# Patient Record
Sex: MALE | Race: ASIAN | NOT HISPANIC OR LATINO | URBAN - METROPOLITAN AREA
[De-identification: names, ages, dates, MRNs, and addresses within clinical notes are randomized per-mention and may not be internally consistent; named-entity substitution may affect disease eponyms.]

---

## 2022-09-25 ENCOUNTER — INPATIENT (INPATIENT)
Facility: HOSPITAL | Age: 52
LOS: 3 days | Discharge: HOME | End: 2022-09-29
Attending: STUDENT IN AN ORGANIZED HEALTH CARE EDUCATION/TRAINING PROGRAM | Admitting: STUDENT IN AN ORGANIZED HEALTH CARE EDUCATION/TRAINING PROGRAM

## 2022-09-25 VITALS
DIASTOLIC BLOOD PRESSURE: 69 MMHG | SYSTOLIC BLOOD PRESSURE: 113 MMHG | TEMPERATURE: 98 F | OXYGEN SATURATION: 98 % | RESPIRATION RATE: 18 BRPM | HEART RATE: 83 BPM

## 2022-09-25 DIAGNOSIS — H81.23 VESTIBULAR NEURONITIS, BILATERAL: ICD-10-CM

## 2022-09-25 LAB
ALBUMIN SERPL ELPH-MCNC: 4.7 G/DL — SIGNIFICANT CHANGE UP (ref 3.5–5.2)
ALP SERPL-CCNC: 73 U/L — SIGNIFICANT CHANGE UP (ref 30–115)
ALT FLD-CCNC: 21 U/L — SIGNIFICANT CHANGE UP (ref 0–41)
ANION GAP SERPL CALC-SCNC: 15 MMOL/L — HIGH (ref 7–14)
AST SERPL-CCNC: 25 U/L — SIGNIFICANT CHANGE UP (ref 0–41)
BASOPHILS # BLD AUTO: 0.02 K/UL — SIGNIFICANT CHANGE UP (ref 0–0.2)
BASOPHILS NFR BLD AUTO: 0.3 % — SIGNIFICANT CHANGE UP (ref 0–1)
BILIRUB SERPL-MCNC: 0.7 MG/DL — SIGNIFICANT CHANGE UP (ref 0.2–1.2)
BUN SERPL-MCNC: 13 MG/DL — SIGNIFICANT CHANGE UP (ref 10–20)
CALCIUM SERPL-MCNC: 9.3 MG/DL — SIGNIFICANT CHANGE UP (ref 8.4–10.5)
CHLORIDE SERPL-SCNC: 100 MMOL/L — SIGNIFICANT CHANGE UP (ref 98–110)
CO2 SERPL-SCNC: 22 MMOL/L — SIGNIFICANT CHANGE UP (ref 17–32)
CREAT SERPL-MCNC: 1 MG/DL — SIGNIFICANT CHANGE UP (ref 0.7–1.5)
EGFR: 92 ML/MIN/1.73M2 — SIGNIFICANT CHANGE UP
EOSINOPHIL # BLD AUTO: 0 K/UL — SIGNIFICANT CHANGE UP (ref 0–0.7)
EOSINOPHIL NFR BLD AUTO: 0 % — SIGNIFICANT CHANGE UP (ref 0–8)
GLUCOSE SERPL-MCNC: 150 MG/DL — HIGH (ref 70–99)
HCT VFR BLD CALC: 45.5 % — SIGNIFICANT CHANGE UP (ref 42–52)
HGB BLD-MCNC: 16.2 G/DL — SIGNIFICANT CHANGE UP (ref 14–18)
IMM GRANULOCYTES NFR BLD AUTO: 0.4 % — HIGH (ref 0.1–0.3)
LACTATE SERPL-SCNC: 4.1 MMOL/L — CRITICAL HIGH (ref 0.7–2)
LIDOCAIN IGE QN: 22 U/L — SIGNIFICANT CHANGE UP (ref 7–60)
LYMPHOCYTES # BLD AUTO: 1.24 K/UL — SIGNIFICANT CHANGE UP (ref 1.2–3.4)
LYMPHOCYTES # BLD AUTO: 18.3 % — LOW (ref 20.5–51.1)
MAGNESIUM SERPL-MCNC: 1.9 MG/DL — SIGNIFICANT CHANGE UP (ref 1.8–2.4)
MCHC RBC-ENTMCNC: 30.4 PG — SIGNIFICANT CHANGE UP (ref 27–31)
MCHC RBC-ENTMCNC: 35.6 G/DL — SIGNIFICANT CHANGE UP (ref 32–37)
MCV RBC AUTO: 85.4 FL — SIGNIFICANT CHANGE UP (ref 80–94)
MONOCYTES # BLD AUTO: 0.43 K/UL — SIGNIFICANT CHANGE UP (ref 0.1–0.6)
MONOCYTES NFR BLD AUTO: 6.3 % — SIGNIFICANT CHANGE UP (ref 1.7–9.3)
NEUTROPHILS # BLD AUTO: 5.07 K/UL — SIGNIFICANT CHANGE UP (ref 1.4–6.5)
NEUTROPHILS NFR BLD AUTO: 74.7 % — SIGNIFICANT CHANGE UP (ref 42.2–75.2)
NRBC # BLD: 0 /100 WBCS — SIGNIFICANT CHANGE UP (ref 0–0)
NT-PROBNP SERPL-SCNC: 59 PG/ML — SIGNIFICANT CHANGE UP (ref 0–300)
NT-PROBNP SERPL-SCNC: 62 PG/ML — SIGNIFICANT CHANGE UP (ref 0–300)
PLATELET # BLD AUTO: 231 K/UL — SIGNIFICANT CHANGE UP (ref 130–400)
POTASSIUM SERPL-MCNC: 4.1 MMOL/L — SIGNIFICANT CHANGE UP (ref 3.5–5)
POTASSIUM SERPL-SCNC: 4.1 MMOL/L — SIGNIFICANT CHANGE UP (ref 3.5–5)
PROT SERPL-MCNC: 7.4 G/DL — SIGNIFICANT CHANGE UP (ref 6–8)
RBC # BLD: 5.33 M/UL — SIGNIFICANT CHANGE UP (ref 4.7–6.1)
RBC # FLD: 13.2 % — SIGNIFICANT CHANGE UP (ref 11.5–14.5)
SARS-COV-2 RNA SPEC QL NAA+PROBE: SIGNIFICANT CHANGE UP
SODIUM SERPL-SCNC: 137 MMOL/L — SIGNIFICANT CHANGE UP (ref 135–146)
TROPONIN T SERPL-MCNC: <0.01 NG/ML — SIGNIFICANT CHANGE UP
WBC # BLD: 6.79 K/UL — SIGNIFICANT CHANGE UP (ref 4.8–10.8)
WBC # FLD AUTO: 6.79 K/UL — SIGNIFICANT CHANGE UP (ref 4.8–10.8)

## 2022-09-25 PROCEDURE — 71045 X-RAY EXAM CHEST 1 VIEW: CPT | Mod: 26

## 2022-09-25 PROCEDURE — 93010 ELECTROCARDIOGRAM REPORT: CPT

## 2022-09-25 PROCEDURE — 71275 CT ANGIOGRAPHY CHEST: CPT | Mod: 26,MA

## 2022-09-25 PROCEDURE — 70496 CT ANGIOGRAPHY HEAD: CPT | Mod: 26,MA

## 2022-09-25 PROCEDURE — 70498 CT ANGIOGRAPHY NECK: CPT | Mod: 26,MA

## 2022-09-25 PROCEDURE — 99291 CRITICAL CARE FIRST HOUR: CPT

## 2022-09-25 RX ORDER — SODIUM CHLORIDE 9 MG/ML
1000 INJECTION, SOLUTION INTRAVENOUS ONCE
Refills: 0 | Status: COMPLETED | OUTPATIENT
Start: 2022-09-25 | End: 2022-09-25

## 2022-09-25 RX ORDER — MECLIZINE HCL 12.5 MG
50 TABLET ORAL ONCE
Refills: 0 | Status: COMPLETED | OUTPATIENT
Start: 2022-09-25 | End: 2022-09-25

## 2022-09-25 RX ORDER — METOCLOPRAMIDE HCL 10 MG
10 TABLET ORAL ONCE
Refills: 0 | Status: COMPLETED | OUTPATIENT
Start: 2022-09-25 | End: 2022-09-25

## 2022-09-25 RX ADMIN — Medication 104 MILLIGRAM(S): at 21:40

## 2022-09-25 RX ADMIN — Medication 50 MILLIGRAM(S): at 21:40

## 2022-09-25 RX ADMIN — SODIUM CHLORIDE 1000 MILLILITER(S): 9 INJECTION, SOLUTION INTRAVENOUS at 22:43

## 2022-09-25 RX ADMIN — SODIUM CHLORIDE 1000 MILLILITER(S): 9 INJECTION, SOLUTION INTRAVENOUS at 21:38

## 2022-09-25 NOTE — ED ADULT NURSE NOTE - OBJECTIVE STATEMENT
Pt with C/O N/V weakness on and off from Friday worse today with weakness SOB , chest pain with chills .

## 2022-09-25 NOTE — ED ADULT NURSE REASSESSMENT NOTE - NS ED NURSE REASSESS COMMENT FT1
Pt reassessed A/O times 4 Vs stable report filling less dizziness , C/O weakness  denies chest pain denies SOB, no N/V on  giving nursing observation.

## 2022-09-26 DIAGNOSIS — R09.89 OTHER SPECIFIED SYMPTOMS AND SIGNS INVOLVING THE CIRCULATORY AND RESPIRATORY SYSTEMS: ICD-10-CM

## 2022-09-26 LAB
A1C WITH ESTIMATED AVERAGE GLUCOSE RESULT: 6.2 % — HIGH (ref 4–5.6)
ALBUMIN SERPL ELPH-MCNC: 4.2 G/DL — SIGNIFICANT CHANGE UP (ref 3.5–5.2)
ALP SERPL-CCNC: 63 U/L — SIGNIFICANT CHANGE UP (ref 30–115)
ALT FLD-CCNC: 17 U/L — SIGNIFICANT CHANGE UP (ref 0–41)
ANION GAP SERPL CALC-SCNC: 11 MMOL/L — SIGNIFICANT CHANGE UP (ref 7–14)
APPEARANCE UR: CLEAR — SIGNIFICANT CHANGE UP
APTT BLD: 27.1 SEC — SIGNIFICANT CHANGE UP (ref 27–39.2)
APTT BLD: 95.2 SEC — CRITICAL HIGH (ref 27–39.2)
AST SERPL-CCNC: 19 U/L — SIGNIFICANT CHANGE UP (ref 0–41)
BASOPHILS # BLD AUTO: 0.02 K/UL — SIGNIFICANT CHANGE UP (ref 0–0.2)
BASOPHILS NFR BLD AUTO: 0.3 % — SIGNIFICANT CHANGE UP (ref 0–1)
BILIRUB SERPL-MCNC: 0.7 MG/DL — SIGNIFICANT CHANGE UP (ref 0.2–1.2)
BILIRUB UR-MCNC: NEGATIVE — SIGNIFICANT CHANGE UP
BLD GP AB SCN SERPL QL: SIGNIFICANT CHANGE UP
BUN SERPL-MCNC: 10 MG/DL — SIGNIFICANT CHANGE UP (ref 10–20)
CALCIUM SERPL-MCNC: 8.7 MG/DL — SIGNIFICANT CHANGE UP (ref 8.4–10.5)
CHLORIDE SERPL-SCNC: 105 MMOL/L — SIGNIFICANT CHANGE UP (ref 98–110)
CHOLEST SERPL-MCNC: 266 MG/DL — HIGH
CO2 SERPL-SCNC: 26 MMOL/L — SIGNIFICANT CHANGE UP (ref 17–32)
COLOR SPEC: SIGNIFICANT CHANGE UP
CREAT SERPL-MCNC: 1 MG/DL — SIGNIFICANT CHANGE UP (ref 0.7–1.5)
D DIMER BLD IA.RAPID-MCNC: 287 NG/ML DDU — HIGH (ref 0–230)
DIFF PNL FLD: NEGATIVE — SIGNIFICANT CHANGE UP
EGFR: 92 ML/MIN/1.73M2 — SIGNIFICANT CHANGE UP
EOSINOPHIL # BLD AUTO: 0.02 K/UL — SIGNIFICANT CHANGE UP (ref 0–0.7)
EOSINOPHIL NFR BLD AUTO: 0.3 % — SIGNIFICANT CHANGE UP (ref 0–8)
ESTIMATED AVERAGE GLUCOSE: 131 MG/DL — HIGH (ref 68–114)
GLUCOSE BLDC GLUCOMTR-MCNC: 111 MG/DL — HIGH (ref 70–99)
GLUCOSE BLDC GLUCOMTR-MCNC: 115 MG/DL — HIGH (ref 70–99)
GLUCOSE SERPL-MCNC: 106 MG/DL — HIGH (ref 70–99)
GLUCOSE UR QL: NEGATIVE — SIGNIFICANT CHANGE UP
HCT VFR BLD CALC: 42.9 % — SIGNIFICANT CHANGE UP (ref 42–52)
HDLC SERPL-MCNC: 47 MG/DL — SIGNIFICANT CHANGE UP
HGB BLD-MCNC: 14.8 G/DL — SIGNIFICANT CHANGE UP (ref 14–18)
IMM GRANULOCYTES NFR BLD AUTO: 0.1 % — SIGNIFICANT CHANGE UP (ref 0.1–0.3)
INR BLD: 1.05 RATIO — SIGNIFICANT CHANGE UP (ref 0.65–1.3)
KETONES UR-MCNC: NEGATIVE — SIGNIFICANT CHANGE UP
LACTATE SERPL-SCNC: 1.4 MMOL/L — SIGNIFICANT CHANGE UP (ref 0.7–2)
LACTATE SERPL-SCNC: 1.7 MMOL/L — SIGNIFICANT CHANGE UP (ref 0.7–2)
LEUKOCYTE ESTERASE UR-ACNC: NEGATIVE — SIGNIFICANT CHANGE UP
LIPID PNL WITH DIRECT LDL SERPL: 196 MG/DL — HIGH
LYMPHOCYTES # BLD AUTO: 2.31 K/UL — SIGNIFICANT CHANGE UP (ref 1.2–3.4)
LYMPHOCYTES # BLD AUTO: 34.5 % — SIGNIFICANT CHANGE UP (ref 20.5–51.1)
MAGNESIUM SERPL-MCNC: 2.1 MG/DL — SIGNIFICANT CHANGE UP (ref 1.8–2.4)
MCHC RBC-ENTMCNC: 30.3 PG — SIGNIFICANT CHANGE UP (ref 27–31)
MCHC RBC-ENTMCNC: 34.5 G/DL — SIGNIFICANT CHANGE UP (ref 32–37)
MCV RBC AUTO: 87.7 FL — SIGNIFICANT CHANGE UP (ref 80–94)
MONOCYTES # BLD AUTO: 0.66 K/UL — HIGH (ref 0.1–0.6)
MONOCYTES NFR BLD AUTO: 9.9 % — HIGH (ref 1.7–9.3)
NEUTROPHILS # BLD AUTO: 3.67 K/UL — SIGNIFICANT CHANGE UP (ref 1.4–6.5)
NEUTROPHILS NFR BLD AUTO: 54.9 % — SIGNIFICANT CHANGE UP (ref 42.2–75.2)
NITRITE UR-MCNC: NEGATIVE — SIGNIFICANT CHANGE UP
NON HDL CHOLESTEROL: 219 MG/DL — HIGH
NRBC # BLD: 0 /100 WBCS — SIGNIFICANT CHANGE UP (ref 0–0)
PH UR: 7 — SIGNIFICANT CHANGE UP (ref 5–8)
PLATELET # BLD AUTO: 231 K/UL — SIGNIFICANT CHANGE UP (ref 130–400)
POTASSIUM SERPL-MCNC: 3.5 MMOL/L — SIGNIFICANT CHANGE UP (ref 3.5–5)
POTASSIUM SERPL-SCNC: 3.5 MMOL/L — SIGNIFICANT CHANGE UP (ref 3.5–5)
PROT SERPL-MCNC: 6.5 G/DL — SIGNIFICANT CHANGE UP (ref 6–8)
PROT UR-MCNC: SIGNIFICANT CHANGE UP
PROTHROM AB SERPL-ACNC: 12.1 SEC — SIGNIFICANT CHANGE UP (ref 9.95–12.87)
RBC # BLD: 4.89 M/UL — SIGNIFICANT CHANGE UP (ref 4.7–6.1)
RBC # FLD: 13.2 % — SIGNIFICANT CHANGE UP (ref 11.5–14.5)
SODIUM SERPL-SCNC: 142 MMOL/L — SIGNIFICANT CHANGE UP (ref 135–146)
SP GR SPEC: >1.05 (ref 1.01–1.03)
TRIGL SERPL-MCNC: 116 MG/DL — SIGNIFICANT CHANGE UP
TROPONIN T SERPL-MCNC: <0.01 NG/ML — SIGNIFICANT CHANGE UP
TSH SERPL-MCNC: 1.81 UIU/ML — SIGNIFICANT CHANGE UP (ref 0.27–4.2)
UROBILINOGEN FLD QL: SIGNIFICANT CHANGE UP
WBC # BLD: 6.69 K/UL — SIGNIFICANT CHANGE UP (ref 4.8–10.8)
WBC # FLD AUTO: 6.69 K/UL — SIGNIFICANT CHANGE UP (ref 4.8–10.8)

## 2022-09-26 PROCEDURE — 99222 1ST HOSP IP/OBS MODERATE 55: CPT

## 2022-09-26 PROCEDURE — 99223 1ST HOSP IP/OBS HIGH 75: CPT

## 2022-09-26 RX ORDER — ACETAMINOPHEN 500 MG
650 TABLET ORAL ONCE
Refills: 0 | Status: COMPLETED | OUTPATIENT
Start: 2022-09-26 | End: 2022-09-26

## 2022-09-26 RX ORDER — CHLORHEXIDINE GLUCONATE 213 G/1000ML
1 SOLUTION TOPICAL
Refills: 0 | Status: DISCONTINUED | OUTPATIENT
Start: 2022-09-26 | End: 2022-09-29

## 2022-09-26 RX ORDER — MECLIZINE HCL 12.5 MG
50 TABLET ORAL ONCE
Refills: 0 | Status: COMPLETED | OUTPATIENT
Start: 2022-09-26 | End: 2022-09-26

## 2022-09-26 RX ORDER — ACETAMINOPHEN 500 MG
650 TABLET ORAL EVERY 6 HOURS
Refills: 0 | Status: DISCONTINUED | OUTPATIENT
Start: 2022-09-26 | End: 2022-09-29

## 2022-09-26 RX ORDER — HEPARIN SODIUM 5000 [USP'U]/ML
INJECTION INTRAVENOUS; SUBCUTANEOUS
Qty: 25000 | Refills: 0 | Status: DISCONTINUED | OUTPATIENT
Start: 2022-09-26 | End: 2022-09-26

## 2022-09-26 RX ORDER — HEPARIN SODIUM 5000 [USP'U]/ML
3000 INJECTION INTRAVENOUS; SUBCUTANEOUS EVERY 6 HOURS
Refills: 0 | Status: DISCONTINUED | OUTPATIENT
Start: 2022-09-26 | End: 2022-09-26

## 2022-09-26 RX ORDER — PANTOPRAZOLE SODIUM 20 MG/1
40 TABLET, DELAYED RELEASE ORAL
Refills: 0 | Status: DISCONTINUED | OUTPATIENT
Start: 2022-09-26 | End: 2022-09-29

## 2022-09-26 RX ORDER — HEPARIN SODIUM 5000 [USP'U]/ML
6500 INJECTION INTRAVENOUS; SUBCUTANEOUS EVERY 6 HOURS
Refills: 0 | Status: DISCONTINUED | OUTPATIENT
Start: 2022-09-26 | End: 2022-09-26

## 2022-09-26 RX ORDER — ENOXAPARIN SODIUM 100 MG/ML
80 INJECTION SUBCUTANEOUS EVERY 12 HOURS
Refills: 0 | Status: DISCONTINUED | OUTPATIENT
Start: 2022-09-26 | End: 2022-09-28

## 2022-09-26 RX ORDER — HEPARIN SODIUM 5000 [USP'U]/ML
6500 INJECTION INTRAVENOUS; SUBCUTANEOUS ONCE
Refills: 0 | Status: DISCONTINUED | OUTPATIENT
Start: 2022-09-26 | End: 2022-09-26

## 2022-09-26 RX ORDER — ATORVASTATIN CALCIUM 80 MG/1
80 TABLET, FILM COATED ORAL AT BEDTIME
Refills: 0 | Status: DISCONTINUED | OUTPATIENT
Start: 2022-09-26 | End: 2022-09-29

## 2022-09-26 RX ADMIN — SODIUM CHLORIDE 1000 MILLILITER(S): 9 INJECTION, SOLUTION INTRAVENOUS at 05:27

## 2022-09-26 RX ADMIN — Medication 650 MILLIGRAM(S): at 23:03

## 2022-09-26 RX ADMIN — HEPARIN SODIUM 1400 UNIT(S)/HR: 5000 INJECTION INTRAVENOUS; SUBCUTANEOUS at 04:56

## 2022-09-26 RX ADMIN — SODIUM CHLORIDE 1000 MILLILITER(S): 9 INJECTION, SOLUTION INTRAVENOUS at 05:28

## 2022-09-26 RX ADMIN — Medication 650 MILLIGRAM(S): at 23:43

## 2022-09-26 RX ADMIN — ENOXAPARIN SODIUM 80 MILLIGRAM(S): 100 INJECTION SUBCUTANEOUS at 18:44

## 2022-09-26 RX ADMIN — Medication 50 MILLIGRAM(S): at 05:27

## 2022-09-26 RX ADMIN — HEPARIN SODIUM 6500 UNIT(S): 5000 INJECTION INTRAVENOUS; SUBCUTANEOUS at 04:55

## 2022-09-26 RX ADMIN — ATORVASTATIN CALCIUM 80 MILLIGRAM(S): 80 TABLET, FILM COATED ORAL at 21:30

## 2022-09-26 RX ADMIN — Medication 10 MILLIGRAM(S): at 05:28

## 2022-09-26 RX ADMIN — PANTOPRAZOLE SODIUM 40 MILLIGRAM(S): 20 TABLET, DELAYED RELEASE ORAL at 10:50

## 2022-09-26 RX ADMIN — Medication 650 MILLIGRAM(S): at 14:00

## 2022-09-26 NOTE — H&P ADULT - NSHPREVIEWOFSYSTEMS_GEN_ALL_CORE
CONSTITUTIONAL - No fever, No diaphoresis, No weight change  SKIN - No rash  HEMATOLOGIC - No abnormal bleeding or bruising  EYES - No eye pain or changes in vison, endorses sevre dizziness   ENT - No change in hearing, No sore throat, No neck pain, No rhinorrhea, No ear pain  RESPIRATORY - No shortness of breath, No cough  CARDIAC -No chest pain, No palpitations  GI - No abdominal pain, endorses nausea, No vomiting, No diarrhea, No constipation, No bright red blood per rectum or melena. No flank pain  - No dysuria, frequency, hematuria.   ENDO - No polydypsia, No polyuria, No heat/cold intolerance  MUSCULOSKELETAL - No joint paint, No swelling, No back pain  NEUROLOGIC - No numbness, No focal weakness, No headache, No dizziness  All other systems negative, unless specified in HPI
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
## 2022-09-26 NOTE — H&P ADULT - ATTENDING COMMENTS
EKG reviewed normal sinus rhythm, non-specific ST-T wave abnormality  CXR reviewed no bilateral opacity or effusion    PHYSICAL EXAM:  General Appearance: NAD, normal for age and gender. 	  Neck: Normal JVP, no bruit.   Eyes: Conjunctiva clear, Extra Ocular muscles intact. No scleral icterus. Nystagmus noted.  ENMT: Moist oral mucosa. No oral lesion.  Cardiovascular: Regular rate and rhythm S1 S2, No JVD, No murmurs.  Respiratory: Lungs clear to auscultation. No wheezes, rales or rhonchi.  Psychiatry: Alert and oriented x 3, Mood & affect appropriate.  Gastrointestinal:  Soft, Non-tender, Non-distended.  Neurologic: Mild facial droop no other focal deficits.  Musculoskeletal/ extremities: Normal range of motion. No joint swelling. No clubbing, cyanosis or edema.  Vascular: Peripheral pulses palpable bilaterally.  Skin/Integumen: No rashes, No ecchymoses, No cyanosis.    # Dizziness, room spinning rule out CVA   - check lipid profile, [id number] and TSH  - MR Head  - follow up neurology  - obtain Echo with bubble study     # Reading of acute pulmonary of ANGIE without right heart strain  - start anticoagulation for now, repeat CTA chest recommended by radiologist for further confirmation  - obtain D-dimer and LE venous duplex    # Elevated lactic acidosis likely 2/2 dehydration  - repeat lactate after hydration    # DVT prophylaxis - on lovenox subcut  # Code status - Full Code EKG reviewed normal sinus rhythm, non-specific ST-T wave abnormality  CXR reviewed no bilateral opacity or effusion    PHYSICAL EXAM:  General Appearance: NAD, normal for age and gender. 	  Neck: Normal JVP, no bruit.   Eyes: Conjunctiva clear, Extra Ocular muscles intact. No scleral icterus. Nystagmus noted.  ENMT: Moist oral mucosa. No oral lesion.  Cardiovascular: Regular rate and rhythm S1 S2, No JVD, No murmurs.  Respiratory: Lungs clear to auscultation. No wheezes, rales or rhonchi.  Psychiatry: Alert and oriented x 3, Mood & affect appropriate.  Gastrointestinal:  Soft, Non-tender, Non-distended.  Neurologic: Mild facial droop no other focal deficits.  Musculoskeletal/ extremities: Normal range of motion. No joint swelling. No clubbing, cyanosis or edema.  Vascular: Peripheral pulses palpable bilaterally.  Skin/Integumen: No rashes, No ecchymoses, No cyanosis.    # Dizziness, room spinning, right facial droop rule out CVA   - check lipid profile, [id number] and TSH  - MR Head  - follow up neurology  - obtain Echo with bubble study     # Reading of acute pulmonary of ANGIE without right heart strain  - start anticoagulation for now, repeat CTA chest recommended by radiologist for further confirmation  - obtain D-dimer and LE venous duplex    # Elevated lactic acidosis likely 2/2 dehydration  - repeat lactate after hydration    # DVT prophylaxis - on lovenox subcut  # Code status - Full Code

## 2022-09-26 NOTE — H&P ADULT - NSICDXFAMILYHX_GEN_ALL_CORE_FT
FAMILY HISTORY:  Father  Still living? Unknown  FH: thyroid cancer, Age at diagnosis: Age Unknown    Mother  Still living? Unknown  Family history of stroke, Age at diagnosis: Age Unknown
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
## 2022-09-26 NOTE — ED PROVIDER NOTE - NS ED ROS FT
Review of Systems:  	•	CONSTITUTIONAL - no fever, no diaphoresis, no chills  	•	SKIN - no rash  	•	HEMATOLOGIC - no bleeding, no bruising  	•	EYES - no eye pain, no blurry vision  	•	ENT - no congestion  	•	RESPIRATORY - no shortness of breath, no cough  	•	CARDIAC - +chest pain, no palpitations  	•	GI - no abd pain, + nausea, + vomiting, no diarrhea, no constipation  	•	GENITO-URINARY - no dysuria; no hematuria, no increased urinary frequency  	•	MUSCULOSKELETAL - no joint paint, no swelling, no redness  	•	NEUROLOGIC - +dizziness, no weakness, no headache, no paresthesias, no LOC  	•	PSYCH - no anxiety, no depression  	All other ROS are negative except as documented in HPI.

## 2022-09-26 NOTE — ED PROVIDER NOTE - OBJECTIVE STATEMENT
51-year-old male with no past medical history presenting for evaluation of dizziness associated with vomiting since Friday night.  States dizziness is worse with movement.  Patient also reports having midsternal, nonradiating chest pain today which got better.  Symptoms are moderate.  No alleviating factors.  Took Tylenol with no relief.  No recent travel, recent surgery, leg swelling, calf pain, or history of blood clots or hormonal use.
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
## 2022-09-26 NOTE — DISCHARGE NOTE NURSING/CASE MANAGEMENT/SOCIAL WORK - PATIENT PORTAL LINK FT
You can access the FollowMyHealth Patient Portal offered by [facility] by registering at the following website: [email or web address]. By joining [facility]’s FollowMyHealth portal, you will also be able to view your health information using other applications (apps) compatible with our system.
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
## 2022-09-26 NOTE — SWALLOW BEDSIDE ASSESSMENT ADULT - SLP GENERAL OBSERVATIONS
pt received on ED stretcher eating breakfast w/o c/o pain. +persistent dizziness, MD aware. +room air +speech clear and fluent

## 2022-09-26 NOTE — ED PROVIDER NOTE - CARE PLAN
1 Principal Discharge DX:	Dizziness  Secondary Diagnosis:	Pulmonary embolism  Secondary Diagnosis:	Acute chest pain

## 2022-09-26 NOTE — CONSULT NOTE ADULT - NS ATTEND AMEND GEN_ALL_CORE FT
Pt is a 50 yo M with no significant PMHx who presents with vertigo x 3 days. Exam notable for left beating and vertical nystagmus, also present with primary gaze. Mild right facial paresis. NIHSS 1. CT head without acute process. CTA head/neck without evidence of significant flow limiting stenosis or dissection, however did note PE which was then not seen on CT chest (granted some motion degradation per radiology). No know h/o stroke, DVT/PE or clotting disorder. MRI brain pending, possible left pontine stroke. Recommend TTE with bubble study (may need ALESSANDRO). Hypercoag panel, lipid profile, A1c. Agree with AC. PT/OT/ST, tele, -160, q4 neurochecks.

## 2022-09-26 NOTE — ED ADULT NURSE REASSESSMENT NOTE - NS ED NURSE REASSESS COMMENT FT1
pt received from previous RN. pt is sleeping, even and unlabored breathing on RA, saturating 98%. pt receiving K iv, Cardiac monitoring in progress. pt awaiting ICU bed.

## 2022-09-26 NOTE — PATIENT PROFILE ADULT - FALL HARM RISK - HARM RISK INTERVENTIONS
Assistance with ambulation/Assistance OOB with selected safe patient handling equipment/Communicate Risk of Fall with Harm to all staff/Discuss with provider need for PT consult/Monitor gait and stability/Provide patient with walking aids - walker, cane, crutches/Reinforce activity limits and safety measures with patient and family/Sit up slowly, dangle for a short time, stand at bedside before walking/Tailored Fall Risk Interventions/Visual Cue: Yellow wristband and red socks/Bed in lowest position, wheels locked, appropriate side rails in place/Call bell, personal items and telephone in reach/Instruct patient to call for assistance before getting out of bed or chair/Non-slip footwear when patient is out of bed/[place] to call system/Physically safe environment - no spills, clutter or unnecessary equipment/Purposeful Proactive Rounding/Room/bathroom lighting operational, light cord in reach

## 2022-09-26 NOTE — ED ADULT NURSE REASSESSMENT NOTE - NS ED NURSE REASSESS COMMENT FT1
pt received from previous RN. pt is  currently sleeping, even and unlabored breathing on RA. Heparin infusing at 14ml/hr, no s/s of bleeding. cardiac monitoring in progress, wide at bedside. pt awating bed placement.

## 2022-09-26 NOTE — CONSULT NOTE ADULT - ASSESSMENT
51-year-old RHM with no past medical history, presenting for evaluation of room spinning dizziness since 3 days, worse today. Dizziness is described as spinning sensation especially when eyes are open and there is no alleviating factors. States dizziness is worse with any kind of movement or position change.  Patient also reports having midsternal, non radiating chest pain. Denies focal weakness numbness headache, speech deficits fall trauma or ear symptoms. CTH negative for acute intracranial finding. CTA H/N negative for LVO . CT chest revealed left ANGIE PE heparin drip started by medical team. nihss 1, has right facial asymmetry and fast phase rotatory nystagmus on exam and patient remains very symptomatic on exam.    #Dizziness  r/o posterior circulation cva      Plan  ·	Telemonitoring  ·	N/C MRI BRAIN   ·	Q 4 neuro checks and vital signs  ·	Echo with bubble  ·	Lipid profile, hbaic, tsh EKG, d-dimer  ·	Start Lipitor 80 mg q 24  ·	Dysphagia screen  ·	PT/REHAB  ·	Neuro attending note will follow

## 2022-09-26 NOTE — H&P ADULT - NSHPPHYSICALEXAM_GEN_ALL_CORE
GENERAL: well-developed, AAOx3, Mild right facial droop   HEENT:  Atraumatic, Normocephalic. EOMI, PERRLA, conjunctiva and sclera clear, No JVD, fast beating nystagmus left eye   PULMONARY: Clear to auscultation bilaterally; No wheeze  CARDIOVASCULAR: Regular rate and rhythm; No murmurs, rubs, or gallops  GASTROINTESTINAL: Soft, Nontender, Nondistended; Bowel sounds present  MUSCULOSKELETAL:  2+ Peripheral Pulses, No clubbing, cyanosis, or edema  NEUROLOGY: non-focal. intact strength bilateral upper and lower extremities, intact sensation  SKIN: No rashes or lesions

## 2022-09-26 NOTE — DISCHARGE NOTE NURSING/CASE MANAGEMENT/SOCIAL WORK - NSDCPEFALRISK_GEN_ALL_CORE
For information on Fall & Injury Prevention, visit: [email or web address] OR  [email or web address] OR  https://www.cdc.gov/steadi/patient.html
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
## 2022-09-27 LAB
ALBUMIN SERPL ELPH-MCNC: 4 G/DL — SIGNIFICANT CHANGE UP (ref 3.5–5.2)
ALP SERPL-CCNC: 59 U/L — SIGNIFICANT CHANGE UP (ref 30–115)
ALT FLD-CCNC: 19 U/L — SIGNIFICANT CHANGE UP (ref 0–41)
ANION GAP SERPL CALC-SCNC: 9 MMOL/L — SIGNIFICANT CHANGE UP (ref 7–14)
AST SERPL-CCNC: 21 U/L — SIGNIFICANT CHANGE UP (ref 0–41)
BASOPHILS # BLD AUTO: 0.02 K/UL — SIGNIFICANT CHANGE UP (ref 0–0.2)
BASOPHILS NFR BLD AUTO: 0.4 % — SIGNIFICANT CHANGE UP (ref 0–1)
BILIRUB SERPL-MCNC: 0.9 MG/DL — SIGNIFICANT CHANGE UP (ref 0.2–1.2)
BUN SERPL-MCNC: 10 MG/DL — SIGNIFICANT CHANGE UP (ref 10–20)
CALCIUM SERPL-MCNC: 8.4 MG/DL — SIGNIFICANT CHANGE UP (ref 8.4–10.5)
CHLORIDE SERPL-SCNC: 103 MMOL/L — SIGNIFICANT CHANGE UP (ref 98–110)
CO2 SERPL-SCNC: 28 MMOL/L — SIGNIFICANT CHANGE UP (ref 17–32)
CREAT SERPL-MCNC: 1 MG/DL — SIGNIFICANT CHANGE UP (ref 0.7–1.5)
EGFR: 92 ML/MIN/1.73M2 — SIGNIFICANT CHANGE UP
EOSINOPHIL # BLD AUTO: 0.06 K/UL — SIGNIFICANT CHANGE UP (ref 0–0.7)
EOSINOPHIL NFR BLD AUTO: 1.3 % — SIGNIFICANT CHANGE UP (ref 0–8)
GLUCOSE BLDC GLUCOMTR-MCNC: 112 MG/DL — HIGH (ref 70–99)
GLUCOSE BLDC GLUCOMTR-MCNC: 135 MG/DL — HIGH (ref 70–99)
GLUCOSE BLDC GLUCOMTR-MCNC: 99 MG/DL — SIGNIFICANT CHANGE UP (ref 70–99)
GLUCOSE SERPL-MCNC: 113 MG/DL — HIGH (ref 70–99)
HCT VFR BLD CALC: 41.2 % — LOW (ref 42–52)
HGB BLD-MCNC: 14.1 G/DL — SIGNIFICANT CHANGE UP (ref 14–18)
IMM GRANULOCYTES NFR BLD AUTO: 0.2 % — SIGNIFICANT CHANGE UP (ref 0.1–0.3)
LYMPHOCYTES # BLD AUTO: 2.07 K/UL — SIGNIFICANT CHANGE UP (ref 1.2–3.4)
LYMPHOCYTES # BLD AUTO: 43.5 % — SIGNIFICANT CHANGE UP (ref 20.5–51.1)
MAGNESIUM SERPL-MCNC: 2 MG/DL — SIGNIFICANT CHANGE UP (ref 1.8–2.4)
MCHC RBC-ENTMCNC: 30.4 PG — SIGNIFICANT CHANGE UP (ref 27–31)
MCHC RBC-ENTMCNC: 34.2 G/DL — SIGNIFICANT CHANGE UP (ref 32–37)
MCV RBC AUTO: 88.8 FL — SIGNIFICANT CHANGE UP (ref 80–94)
MONOCYTES # BLD AUTO: 0.57 K/UL — SIGNIFICANT CHANGE UP (ref 0.1–0.6)
MONOCYTES NFR BLD AUTO: 12 % — HIGH (ref 1.7–9.3)
NEUTROPHILS # BLD AUTO: 2.03 K/UL — SIGNIFICANT CHANGE UP (ref 1.4–6.5)
NEUTROPHILS NFR BLD AUTO: 42.6 % — SIGNIFICANT CHANGE UP (ref 42.2–75.2)
NRBC # BLD: 0 /100 WBCS — SIGNIFICANT CHANGE UP (ref 0–0)
PLATELET # BLD AUTO: 222 K/UL — SIGNIFICANT CHANGE UP (ref 130–400)
POTASSIUM SERPL-MCNC: 3.9 MMOL/L — SIGNIFICANT CHANGE UP (ref 3.5–5)
POTASSIUM SERPL-SCNC: 3.9 MMOL/L — SIGNIFICANT CHANGE UP (ref 3.5–5)
PROT SERPL-MCNC: 6.1 G/DL — SIGNIFICANT CHANGE UP (ref 6–8)
RBC # BLD: 4.64 M/UL — LOW (ref 4.7–6.1)
RBC # FLD: 13.4 % — SIGNIFICANT CHANGE UP (ref 11.5–14.5)
SODIUM SERPL-SCNC: 140 MMOL/L — SIGNIFICANT CHANGE UP (ref 135–146)
WBC # BLD: 4.76 K/UL — LOW (ref 4.8–10.8)
WBC # FLD AUTO: 4.76 K/UL — LOW (ref 4.8–10.8)

## 2022-09-27 PROCEDURE — 93970 EXTREMITY STUDY: CPT | Mod: 26

## 2022-09-27 PROCEDURE — 70551 MRI BRAIN STEM W/O DYE: CPT | Mod: 26

## 2022-09-27 PROCEDURE — 99233 SBSQ HOSP IP/OBS HIGH 50: CPT

## 2022-09-27 PROCEDURE — 99222 1ST HOSP IP/OBS MODERATE 55: CPT

## 2022-09-27 PROCEDURE — 99232 SBSQ HOSP IP/OBS MODERATE 35: CPT

## 2022-09-27 RX ORDER — ONDANSETRON 8 MG/1
4 TABLET, FILM COATED ORAL EVERY 8 HOURS
Refills: 0 | Status: DISCONTINUED | OUTPATIENT
Start: 2022-09-27 | End: 2022-09-29

## 2022-09-27 RX ORDER — MECLIZINE HCL 12.5 MG
12.5 TABLET ORAL EVERY 8 HOURS
Refills: 0 | Status: DISCONTINUED | OUTPATIENT
Start: 2022-09-27 | End: 2022-09-29

## 2022-09-27 RX ORDER — SODIUM CHLORIDE 9 MG/ML
1000 INJECTION INTRAMUSCULAR; INTRAVENOUS; SUBCUTANEOUS
Refills: 0 | Status: DISCONTINUED | OUTPATIENT
Start: 2022-09-27 | End: 2022-09-29

## 2022-09-27 RX ORDER — SODIUM CHLORIDE 9 MG/ML
500 INJECTION INTRAMUSCULAR; INTRAVENOUS; SUBCUTANEOUS ONCE
Refills: 0 | Status: COMPLETED | OUTPATIENT
Start: 2022-09-27 | End: 2022-09-27

## 2022-09-27 RX ADMIN — SODIUM CHLORIDE 75 MILLILITER(S): 9 INJECTION INTRAMUSCULAR; INTRAVENOUS; SUBCUTANEOUS at 12:18

## 2022-09-27 RX ADMIN — SODIUM CHLORIDE 1000 MILLILITER(S): 9 INJECTION INTRAMUSCULAR; INTRAVENOUS; SUBCUTANEOUS at 04:33

## 2022-09-27 RX ADMIN — ONDANSETRON 4 MILLIGRAM(S): 8 TABLET, FILM COATED ORAL at 17:19

## 2022-09-27 RX ADMIN — Medication 12.5 MILLIGRAM(S): at 16:05

## 2022-09-27 RX ADMIN — ENOXAPARIN SODIUM 80 MILLIGRAM(S): 100 INJECTION SUBCUTANEOUS at 17:20

## 2022-09-27 RX ADMIN — Medication 650 MILLIGRAM(S): at 11:52

## 2022-09-27 RX ADMIN — Medication 650 MILLIGRAM(S): at 11:22

## 2022-09-27 RX ADMIN — ATORVASTATIN CALCIUM 80 MILLIGRAM(S): 80 TABLET, FILM COATED ORAL at 21:09

## 2022-09-27 RX ADMIN — CHLORHEXIDINE GLUCONATE 1 APPLICATION(S): 213 SOLUTION TOPICAL at 05:11

## 2022-09-27 RX ADMIN — Medication 650 MILLIGRAM(S): at 17:18

## 2022-09-27 RX ADMIN — PANTOPRAZOLE SODIUM 40 MILLIGRAM(S): 20 TABLET, DELAYED RELEASE ORAL at 05:11

## 2022-09-27 RX ADMIN — ENOXAPARIN SODIUM 80 MILLIGRAM(S): 100 INJECTION SUBCUTANEOUS at 05:11

## 2022-09-27 RX ADMIN — Medication 12.5 MILLIGRAM(S): at 21:10

## 2022-09-27 RX ADMIN — Medication 12.5 MILLIGRAM(S): at 11:22

## 2022-09-27 RX ADMIN — Medication 650 MILLIGRAM(S): at 17:48

## 2022-09-27 NOTE — PROGRESS NOTE ADULT - SUBJECTIVE AND OBJECTIVE BOX
NEUROLOGY CONSULT PROGRESS NOTE    INTERVAL HISTORY:      REVIEW OF SYSTEMS:  As per HPI, otherwise negative for Constitutional, Eyes, Ears/Nose/Mouth/Throat, Neck, Cardiovascular, Respiratory, Gastrointestinal, Genitourinary, Skin, Endocrine, Musculoskeletal, Psychiatric, and Hematologic/Lymphatic.    MEDICATIONS:  acetaminophen     Tablet .. 650 milliGRAM(s) Oral every 6 hours PRN  atorvastatin 80 milliGRAM(s) Oral at bedtime  chlorhexidine 2% Cloths 1 Application(s) Topical <User Schedule>  enoxaparin Injectable 80 milliGRAM(s) SubCutaneous every 12 hours  pantoprazole    Tablet 40 milliGRAM(s) Oral before breakfast    VITAL SIGNS:  Vital Signs Last 24 Hrs  T(C): 36.2 (27 Sep 2022 06:30), Max: 36.9 (26 Sep 2022 15:19)  T(F): 97.1 (27 Sep 2022 06:30), Max: 98.4 (26 Sep 2022 15:19)  HR: 48 (27 Sep 2022 06:30) (44 - 67)  BP: 118/81 (27 Sep 2022 06:30) (105/74 - 128/82)  BP(mean): 93 (27 Sep 2022 06:30) (84 - 104)  RR: 18 (27 Sep 2022 05:39) (17 - 18)  SpO2: 99% (27 Sep 2022 06:30) (97% - 99%)    Parameters below as of 27 Sep 2022 05:39  Patient On (Oxygen Delivery Method): room air      PHYSICAL EXAMINATION:  Constitutional: WDWN; NAD  Eyes: anicteric sclera  Cardiovascular: +S1/S2, RRR; no M/R/G  Neurologic:  - Mental Status:  AAOx3; speech is fluent with intact naming, repetition, and comprehension  - Cranial Nerves II-XII:    II:  PERRLA; visual fields are full to confrontation  III, IV, VI:  EOMI, no nystagmus  V:  facial sensation is intact in the V1-V3 distribution bilaterally.  VII:  face is symmetric with normal eye closure and smile  VIII:  hearing is intact to finger rub  IX, X:  uvula is midline and soft palate rises symmetrically  XI:  head turning and shoulder shrug are intact bilaterally  XII:  tongue protrudes in the midline  - Motor:  strength is 5/5 throughout; normal muscle bulk and tone throughout; no pronator drift  - Reflexes:  2+ and symmetric at the biceps, triceps, brachioradialis, knees, and ankles;  plantar reflexes downgoing bilaterally  - Sensory:  intact to light touch, pin prick, vibration, and joint-position sense throughout  - Coordination:  finger-nose-finger and heel-knee-shin intact without dysmetria; rapid alternating hand movements intact  - Gait:  normal steps, base, arm swing, and turning; heel and toe walking are normal; tandem gait is normal; Romberg testing is negative    LABS:                          14.8   6.69  )-----------( 231      ( 26 Sep 2022 11:22 )             42.9     09-26    142  |  105  |  10  ----------------------------<  106<H>  3.5   |  26  |  1.0    Ca    8.7      26 Sep 2022 11:22  Mg     2.1     09-26    TPro  6.5  /  Alb  4.2  /  TBili  0.7  /  DBili  x   /  AST  19  /  ALT  17  /  AlkPhos  63  09-26    PT/INR - ( 26 Sep 2022 03:25 )   PT: 12.10 sec;   INR: 1.05 ratio         PTT - ( 26 Sep 2022 11:22 )  PTT:95.2 sec  Urinalysis Basic - ( 26 Sep 2022 11:20 )    Color: Light Yellow / Appearance: Clear / SG: >1.050 / pH: x  Gluc: x / Ketone: Negative  / Bili: Negative / Urobili: <2 mg/dL   Blood: x / Protein: Trace / Nitrite: Negative   Leuk Esterase: Negative / RBC: x / WBC x   Sq Epi: x / Non Sq Epi: x / Bacteria: x   NEUROLOGY CONSULT PROGRESS NOTE    OVERNIGHT EVENTS: None. Subjectively feeling less nauseous and dizzy than the day before.       REVIEW OF SYSTEMS:  As per HPI, otherwise negative for Constitutional, Eyes, Ears/Nose/Mouth/Throat, Neck, Cardiovascular, Respiratory, Gastrointestinal, Genitourinary, Skin, Endocrine, Musculoskeletal, Psychiatric, and Hematologic/Lymphatic.    MEDICATIONS:  acetaminophen     Tablet .. 650 milliGRAM(s) Oral every 6 hours PRN  atorvastatin 80 milliGRAM(s) Oral at bedtime  chlorhexidine 2% Cloths 1 Application(s) Topical <User Schedule>  enoxaparin Injectable 80 milliGRAM(s) SubCutaneous every 12 hours  pantoprazole    Tablet 40 milliGRAM(s) Oral before breakfast    VITAL SIGNS:  Vital Signs Last 24 Hrs  T(C): 36.2 (27 Sep 2022 06:30), Max: 36.9 (26 Sep 2022 15:19)  T(F): 97.1 (27 Sep 2022 06:30), Max: 98.4 (26 Sep 2022 15:19)  HR: 48 (27 Sep 2022 06:30) (44 - 67)  BP: 118/81 (27 Sep 2022 06:30) (105/74 - 128/82)  BP(mean): 93 (27 Sep 2022 06:30) (84 - 104)  RR: 18 (27 Sep 2022 05:39) (17 - 18)  SpO2: 99% (27 Sep 2022 06:30) (97% - 99%)    Parameters below as of 27 Sep 2022 05:39  Patient On (Oxygen Delivery Method): room air    PHYSICAL EXAMINATION:  Constitutional: WDWN; NAD  Eyes: anicteric sclera  Cardiovascular: +S1/S2, RRR; no M/R/G  Neurologic:  - Mental Status:  AAOx3; speech is fluent with intact naming, repetition, and comprehension  - Cranial Nerves II-XII:    II:  PERRLA; visual fields are full to confrontation  III, IV, VI:  EOMI, nystagmus beating faster with conjugated gaze upwards and left side  V:  facial sensation is intact in the V1-V3 distribution bilaterally.  VII:  face is symmetric with normal eye closure and smile  VIII:  hearing loss to finger rub on the right side. Degroot to the left side. No skew deviation. Head impulse test positive to the left.   IX, X:  uvula is midline and soft palate rises symmetrically  XI:  head turning and shoulder shrug are intact bilaterally  XII:  tongue protrudes in the midline  - Motor:  strength is 5/5 throughout; normal muscle bulk and tone throughout; no pronator drift  - Reflexes:  2+ and symmetric at the biceps, triceps, brachioradialis, knees, and ankles;  plantar reflexes downgoing bilaterally  - Sensory:  intact to light touch, pin prick, vibration, and joint-position sense throughout  - Coordination:  finger-nose-finger and heel-knee-shin intact without dysmetria; rapid alternating hand movements intact  - Gait: Non assessed     LABS:                          14.8   6.69  )-----------( 231      ( 26 Sep 2022 11:22 )             42.9     09-26    142  |  105  |  10  ----------------------------<  106<H>  3.5   |  26  |  1.0    Ca    8.7      26 Sep 2022 11:22  Mg     2.1     09-26    TPro  6.5  /  Alb  4.2  /  TBili  0.7  /  DBili  x   /  AST  19  /  ALT  17  /  AlkPhos  63  09-26    PT/INR - ( 26 Sep 2022 03:25 )   PT: 12.10 sec;   INR: 1.05 ratio       PTT - ( 26 Sep 2022 11:22 )  PTT:95.2 sec  Urinalysis Basic - ( 26 Sep 2022 11:20 )    Color: Light Yellow / Appearance: Clear / SG: >1.050 / pH: x  Gluc: x / Ketone: Negative  / Bili: Negative / Urobili: <2 mg/dL   Blood: x / Protein: Trace / Nitrite: Negative   Leuk Esterase: Negative / RBC: x / WBC x   Sq Epi: x / Non Sq Epi: x / Bacteria: x

## 2022-09-27 NOTE — PHYSICAL THERAPY INITIAL EVALUATION ADULT - GENERAL OBSERVATIONS, REHAB EVAL
Pt encountered in the bed, c/o dizziness with position changes, agreeable for b/s PT, + spouse at b/s. Praveen. fairly to tx. Pt's BP monitored during the session long sitting position 146/89mmHg, standing 127/93mmHg JR 109bpm, post amb 136/86mmHg 100bpm. ERMELINDA Mccloud made aware.
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
## 2022-09-27 NOTE — PHYSICAL THERAPY INITIAL EVALUATION ADULT - SPECIFY REASON(S)
Pt's chart reviewed currently has no activity orders in place spoke to Resident Dr. Tim to update the activity orders as appropriate. PT IE/tx on hold for now & f/u upon appropriate activity orders.
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
## 2022-09-27 NOTE — CONSULT NOTE ADULT - ASSESSMENT
IMPRESSION:    ?Acute ANGIE PE - resolved  b/l lower lobes atelectasis  Dizziness r/o stroke    RECOMMENDATIONS:    full RVP  Va duplex  Echo with bubble study  Age appropriate screenings  Encourage incentive spirometry  AC for three months   HOB at 46, aspiration precautions  PT / OT  OP follow up to further decision to continue AC IMPRESSION:    Acute ANGIE PE.    Dizziness being worked up for CVA    RECOMMENDATIONS:    FU LE duplex  Echo with bubble study  Calling radiology to confirm segmental VS subsegmental Clot  If the clot is subsugmental, no need for AC considering LE duplex is negative for DVT.    Age appropriate screenings.  Hypercoagulable state work up   Encourage incentive spirometry  Continue AC for now   HOB at 46, aspiration precautions  PT / OT  DW Wife   DW Dr. Hightower
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
## 2022-09-28 LAB
APCR PPP: 2.82 RATIO — SIGNIFICANT CHANGE UP
AT III ACT/NOR PPP CHRO: 98 % — SIGNIFICANT CHANGE UP (ref 85–135)
B2 GLYCOPROT1 AB SER QL: NEGATIVE — SIGNIFICANT CHANGE UP
CARDIOLIPIN AB SER-ACNC: NEGATIVE — SIGNIFICANT CHANGE UP
DRVVT SCREEN TO CONFIRM RATIO: SIGNIFICANT CHANGE UP
HCT VFR BLD CALC: 41.9 % — LOW (ref 42–52)
HCYS SERPL-MCNC: 8.1 UMOL/L — SIGNIFICANT CHANGE UP
HGB BLD-MCNC: 14.3 G/DL — SIGNIFICANT CHANGE UP (ref 14–18)
LA NT DPL PPP QL: SIGNIFICANT CHANGE UP
MCHC RBC-ENTMCNC: 30 PG — SIGNIFICANT CHANGE UP (ref 27–31)
MCHC RBC-ENTMCNC: 34.1 G/DL — SIGNIFICANT CHANGE UP (ref 32–37)
MCV RBC AUTO: 87.8 FL — SIGNIFICANT CHANGE UP (ref 80–94)
NORMALIZED SCT PPP-RTO: 0.97 RATIO — SIGNIFICANT CHANGE UP (ref 0–1.16)
NORMALIZED SCT PPP-RTO: SIGNIFICANT CHANGE UP
NRBC # BLD: 0 /100 WBCS — SIGNIFICANT CHANGE UP (ref 0–0)
PLATELET # BLD AUTO: 194 K/UL — SIGNIFICANT CHANGE UP (ref 130–400)
PROT C ACT/NOR PPP: 115 % — SIGNIFICANT CHANGE UP (ref 65–129)
RBC # BLD: 4.77 M/UL — SIGNIFICANT CHANGE UP (ref 4.7–6.1)
RBC # FLD: 13.2 % — SIGNIFICANT CHANGE UP (ref 11.5–14.5)
WBC # BLD: 4.51 K/UL — LOW (ref 4.8–10.8)
WBC # FLD AUTO: 4.51 K/UL — LOW (ref 4.8–10.8)

## 2022-09-28 PROCEDURE — 99232 SBSQ HOSP IP/OBS MODERATE 35: CPT

## 2022-09-28 PROCEDURE — 93306 TTE W/DOPPLER COMPLETE: CPT | Mod: 26

## 2022-09-28 RX ORDER — SODIUM CHLORIDE 9 MG/ML
250 INJECTION INTRAMUSCULAR; INTRAVENOUS; SUBCUTANEOUS ONCE
Refills: 0 | Status: COMPLETED | OUTPATIENT
Start: 2022-09-28 | End: 2022-09-28

## 2022-09-28 RX ORDER — APIXABAN 2.5 MG/1
10 TABLET, FILM COATED ORAL EVERY 12 HOURS
Refills: 0 | Status: DISCONTINUED | OUTPATIENT
Start: 2022-09-28 | End: 2022-09-29

## 2022-09-28 RX ADMIN — SODIUM CHLORIDE 75 MILLILITER(S): 9 INJECTION INTRAMUSCULAR; INTRAVENOUS; SUBCUTANEOUS at 00:44

## 2022-09-28 RX ADMIN — Medication 12.5 MILLIGRAM(S): at 06:20

## 2022-09-28 RX ADMIN — Medication 12.5 MILLIGRAM(S): at 15:06

## 2022-09-28 RX ADMIN — CHLORHEXIDINE GLUCONATE 1 APPLICATION(S): 213 SOLUTION TOPICAL at 06:20

## 2022-09-28 RX ADMIN — APIXABAN 10 MILLIGRAM(S): 2.5 TABLET, FILM COATED ORAL at 17:44

## 2022-09-28 RX ADMIN — Medication 12.5 MILLIGRAM(S): at 21:07

## 2022-09-28 RX ADMIN — ENOXAPARIN SODIUM 80 MILLIGRAM(S): 100 INJECTION SUBCUTANEOUS at 06:19

## 2022-09-28 RX ADMIN — SODIUM CHLORIDE 250 MILLILITER(S): 9 INJECTION INTRAMUSCULAR; INTRAVENOUS; SUBCUTANEOUS at 00:44

## 2022-09-28 RX ADMIN — ATORVASTATIN CALCIUM 80 MILLIGRAM(S): 80 TABLET, FILM COATED ORAL at 21:07

## 2022-09-28 RX ADMIN — SODIUM CHLORIDE 75 MILLILITER(S): 9 INJECTION INTRAMUSCULAR; INTRAVENOUS; SUBCUTANEOUS at 22:43

## 2022-09-28 RX ADMIN — PANTOPRAZOLE SODIUM 40 MILLIGRAM(S): 20 TABLET, DELAYED RELEASE ORAL at 06:20
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
## 2022-09-28 NOTE — CONSULT NOTE ADULT - ASSESSMENT
IMPRESSION: Rehab of vertigo with negative MRI of the head, Patient offered to come to [facility] outpatient vestibulat rehab program when cleared for d/c    PRECAUTIONS: [  ] Cardiac  [  ] Respiratory  [  ] Seizures [  ] Contact Isolation  [  ] Droplet Isolation  [  ] Other    Weight Bearing Status: wbat    RECOMMENDATION:    Out of Bed to Chair     DVT/Decubiti Prophylaxis    REHAB PLAN:     [x   ] Bedside P/T 3-5 times a week   [ x  ]   Bedside O/T  2-3 times a week             [   ] No Rehab Therapy Indicated                   [   ]  Speech Therapy   Conditioning/ROM                                    ADL  Bed Mobility                                               Conditioning/ROM  Transfers                                                     Bed Mobility  Sitting /Standing Balance                         Transfers                                        Gait Training                                               Sitting/Standing Balance  Stair Training [   ]Applicable                    Home equipment Eval                                                                        Splinting  [   ] Only      GOALS:   ADL   [ x  ]   Independent                    Transfers  [ x  ] Independent                          Ambulation  [ x  ] Independent     [   x ] With device                            [   ]  CG                                                         [   ]  CG                                                                  [   ] CG                            [    ] Min A                                                   [   ] Min A                                                              [   ] Min  A          DISCHARGE PLAN:   [   ]  Good candidate for Intensive Rehabilitation/Hospital based-4A [facility]                                             Will tolerate 3hrs Intensive Rehab Daily                                       [    ]  Short Term Rehab in Skilled Nursing Facility                                       [  x  ]  Home with Outpatient or VN services                                         [    ]  Possible Candidate for Intensive Hospital based Rehab

## 2022-09-28 NOTE — PROGRESS NOTE ADULT - SUBJECTIVE AND OBJECTIVE BOX
RADHA JENNINGS  51y Male    INTERVAL HPI/OVERNIGHT EVENTS:    Pt feels better today. less headache and dizziness  ambulating with the rolling walker  no fever or other complaints  no SOB, chest pain  ROS negative    T(F): 96.7 (09-28-22 @ 08:00), Max: 98.3 (09-28-22 @ 00:05)  HR: 52 (09-28-22 @ 08:00) (46 - 58)  BP: 120/82 (09-28-22 @ 08:00) (108/72 - 139/98)  RR: 18 (09-28-22 @ 08:00) (18 - 18)  SpO2: 100% (09-28-22 @ 08:00) (98% - 100%) on RA    I&O's Summary    27 Sep 2022 07:01  -  28 Sep 2022 07:00  --------------------------------------------------------  IN: 0 mL / OUT: 2300 mL / NET: -2300 mL    CAPILLARY BLOOD GLUCOSE  POCT Blood Glucose.: 112 mg/dL (27 Sep 2022 17:10)      PHYSICAL EXAM:  GENERAL: NAD  HEAD:  Normocephalic  EYES:  conjunctiva and sclera clear  ENMT: Moist mucous membranes  NECK: Supple, No JVD  NERVOUS SYSTEM:  Alert, awake, Good concentration, no limb drift, EOMI, no nystagmus  CHEST/LUNG: CTA b/l  HEART: Regular rate and rhythm  ABDOMEN: Soft, Nontender, Nondistended; Bowel sounds present  EXTREMITIES: No edema  SKIN: warm, dry    Consultant(s) Notes Reviewed:  [x ] YES  [ ] NO  Care Discussed with Consultants/Other Providers [ x] YES  [ ] NO    MEDICATIONS  (STANDING):  apixaban 10 milliGRAM(s) Oral every 12 hours  atorvastatin 80 milliGRAM(s) Oral at bedtime  chlorhexidine 2% Cloths 1 Application(s) Topical <User Schedule>  meclizine 12.5 milliGRAM(s) Oral every 8 hours  pantoprazole    Tablet 40 milliGRAM(s) Oral before breakfast  sodium chloride 0.9%. 1000 milliLiter(s) (75 mL/Hr) IV Continuous <Continuous>    MEDICATIONS  (PRN):  acetaminophen     Tablet .. 650 milliGRAM(s) Oral every 6 hours PRN Moderate Pain (4 - 6)  ondansetron    Tablet 4 milliGRAM(s) Oral every 8 hours PRN Nausea      Telemetry reviewed by me - NSR    LABS:                        14.3   4.51  )-----------( 194      ( 28 Sep 2022 06:36 )             41.9     09-27    140  |  103  |  10  ----------------------------<  113<H>  3.9   |  28  |  1.0    Ca    8.4      27 Sep 2022 06:30  Mg     2.0     09-27    TPro  6.1  /  Alb  4.0  /  TBili  0.9  /  DBili  x   /  AST  21  /  ALT  19  /  AlkPhos  59  09-27              RADIOLOGY & ADDITIONAL TESTS:    Imaging or report Personally Reviewed:  [x ] YES  [ ] NO    < from: [facility] Duplex Lower Ext Vein Scan, Bilat (09.27.22 @ 15:36) >    IMPRESSION:  No evidence of deep venous thrombosis in either lower extremity.    < end of copied text >      < from: MR Head No Cont (09.27.22 @ 14:52) >    IMPRESSION:  Unremarkable noncontrast MRI of the brain.      < end of copied text >      < from: CT Angio Chest PE Protocol w/ IV Cont (09.25.22 @ 22:35) >  IMPRESSION:    No definitive acute thoracic pathology is identified.  A definitive pulmonary embolus was not identified.  Agree with CTA neck read demonstrating left upper lobe pulmonary embolus   on that study, however the pulmonary embolus is not definitively   identified on the current PE study. There is motion artifact at the level   of that vessel, however the vessel does appear centrally contrast   opacified. No definite or additional pulmonary emboli are identified.    < end of copied text >      < from: CT Angio Neck w/ IV Cont (09.25.22 @ 21:42) >    IMPRESSION:  THE FOLLOWING REFLECTS THE AMENDED FINAL IMPRESSION:    1.  No evidence of major vascular stenosis or occlusion.    2.  LEFT upper lobe pulmonary embolus (3/11). CTA chest recommend for   further evaluation.    < end of copied text >      Case discussed with residents and RN on rounds today    Care discussed with pt
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
## 2022-09-28 NOTE — PROGRESS NOTE ADULT - SUBJECTIVE AND OBJECTIVE BOX
Patient is a 51y old  Male who presents with a chief complaint of Dizziness (28 Sep 2022 03:53)        SUBJECTIVE:      REVIEW OF SYSTEMS:    All Pertinent ROS are negative except per HPI       PHYSICAL EXAM  Vital Signs Last 24 Hrs  T(C): 35.9 (28 Sep 2022 08:00), Max: 36.8 (28 Sep 2022 00:05)  T(F): 96.7 (28 Sep 2022 08:00), Max: 98.3 (28 Sep 2022 00:05)  HR: 52 (28 Sep 2022 08:00) (46 - 70)  BP: 120/82 (28 Sep 2022 08:00) (108/72 - 146/89)  BP(mean): 96 (28 Sep 2022 08:00) (83 - 112)  RR: 18 (28 Sep 2022 08:00) (18 - 18)  SpO2: 100% (28 Sep 2022 08:00) (98% - 100%)    Parameters below as of 28 Sep 2022 08:00  Patient On (Oxygen Delivery Method): room air        CONSTITUTIONAL:  Well nourished.  NAD    ENT:   Airway patent,   No thrush    CARDIAC:   Normal rate,   regular rhythm.    no edema      RESPIRATORY:   NO Wheezing  Nnormal chest expansion  Nnot tachypneic,  No use of accessory muscles    GASTROINTESTINAL:  Abdomen soft,   non-tender,   no guarding,   + BS    MUSCULOSKELETAL:   range of motion is not limited,  no clubbing, cyanosis    NEUROLOGICAL:   Alert and oriented   no motor or deficits.    SKIN:   Skin normal color for race,   warm, dry   No evidence of rash.      09-27-22 @ 07:01  -  09-28-22 @ 07:00  --------------------------------------------------------  IN:  Total IN: 0 mL    OUT:    Voided (mL): 2300 mL  Total OUT: 2300 mL    Total NET: -2300 mL          LABS:                          14.3   4.51  )-----------( 194      ( 28 Sep 2022 06:36 )             41.9                                               09-27    140  |  103  |  10  ----------------------------<  113<H>  3.9   |  28  |  1.0    Ca    8.4      27 Sep 2022 06:30  Mg     2.0     09-27    TPro  6.1  /  Alb  4.0  /  TBili  0.9  /  DBili  x   /  AST  21  /  ALT  19  /  AlkPhos  59  09-27      PTT - ( 26 Sep 2022 11:22 )  PTT:95.2 sec                                       Urinalysis Basic - ( 26 Sep 2022 11:20 )    Color: Light Yellow / Appearance: Clear / SG: >1.050 / pH: x  Gluc: x / Ketone: Negative  / Bili: Negative / Urobili: <2 mg/dL   Blood: x / Protein: Trace / Nitrite: Negative   Leuk Esterase: Negative / RBC: x / WBC x   Sq Epi: x / Non Sq Epi: x / Bacteria: x        CARDIAC MARKERS ( 26 Sep 2022 11:22 )  x     / <0.01 ng/mL / x     / x     / x                                                LIVER FUNCTIONS - ( 27 Sep 2022 06:30 )  Alb: 4.0 g/dL / Pro: 6.1 g/dL / ALK PHOS: 59 U/L / ALT: 19 U/L / AST: 21 U/L / GGT: x                                                                                                MEDICATIONS  (STANDING):  atorvastatin 80 milliGRAM(s) Oral at bedtime  chlorhexidine 2% Cloths 1 Application(s) Topical <User Schedule>  enoxaparin Injectable 80 milliGRAM(s) SubCutaneous every 12 hours  meclizine 12.5 milliGRAM(s) Oral every 8 hours  pantoprazole    Tablet 40 milliGRAM(s) Oral before breakfast  sodium chloride 0.9%. 1000 milliLiter(s) (75 mL/Hr) IV Continuous <Continuous>    MEDICATIONS  (PRN):  acetaminophen     Tablet .. 650 milliGRAM(s) Oral every 6 hours PRN Moderate Pain (4 - 6)  ondansetron    Tablet 4 milliGRAM(s) Oral every 8 hours PRN Nausea      X-Rays reviewed    CXR interpreted by me: Patient is a 51y old  Male who presents with a chief complaint of Dizziness (28 Sep 2022 03:53)        SUBJECTIVE:  no new complaints.  NO SOB at rest.        REVIEW OF SYSTEMS:    All Pertinent ROS are negative except per HPI       PHYSICAL EXAM  Vital Signs Last 24 Hrs  T(C): 35.9 (28 Sep 2022 08:00), Max: 36.8 (28 Sep 2022 00:05)  T(F): 96.7 (28 Sep 2022 08:00), Max: 98.3 (28 Sep 2022 00:05)  HR: 52 (28 Sep 2022 08:00) (46 - 70)  BP: 120/82 (28 Sep 2022 08:00) (108/72 - 146/89)  BP(mean): 96 (28 Sep 2022 08:00) (83 - 112)  RR: 18 (28 Sep 2022 08:00) (18 - 18)  SpO2: 100% (28 Sep 2022 08:00) (98% - 100%)    Parameters below as of 28 Sep 2022 08:00  Patient On (Oxygen Delivery Method): room air        CONSTITUTIONAL:  IN  NAD    ENT:   Airway patent,   No thrush    CARDIAC:   Normal rate,   regular rhythm.        RESPIRATORY:   NO Wheezing  Normal chest expansion  Not tachypneic,  No use of accessory muscles    GASTROINTESTINAL:  Abdomen soft,   non-tender,   no guarding,   + BS    MUSCULOSKELETAL:   range of motion is not limited,  no clubbing, cyanosis    NEUROLOGICAL:   Alert and oriented   no motor or deficits.    SKIN:   Skin normal color for race,   warm, dry   No evidence of rash.      09-27-22 @ 07:01  -  09-28-22 @ 07:00  --------------------------------------------------------  IN:  Total IN: 0 mL    OUT:    Voided (mL): 2300 mL  Total OUT: 2300 mL    Total NET: -2300 mL          LABS:                          14.3   4.51  )-----------( 194      ( 28 Sep 2022 06:36 )             41.9                                               09-27    140  |  103  |  10  ----------------------------<  113<H>  3.9   |  28  |  1.0    Ca    8.4      27 Sep 2022 06:30  Mg     2.0     09-27    TPro  6.1  /  Alb  4.0  /  TBili  0.9  /  DBili  x   /  AST  21  /  ALT  19  /  AlkPhos  59  09-27      PTT - ( 26 Sep 2022 11:22 )  PTT:95.2 sec                                       Urinalysis Basic - ( 26 Sep 2022 11:20 )    Color: Light Yellow / Appearance: Clear / SG: >1.050 / pH: x  Gluc: x / Ketone: Negative  / Bili: Negative / Urobili: <2 mg/dL   Blood: x / Protein: Trace / Nitrite: Negative   Leuk Esterase: Negative / RBC: x / WBC x   Sq Epi: x / Non Sq Epi: x / Bacteria: x        CARDIAC MARKERS ( 26 Sep 2022 11:22 )  x     / <0.01 ng/mL / x     / x     / x                                                LIVER FUNCTIONS - ( 27 Sep 2022 06:30 )  Alb: 4.0 g/dL / Pro: 6.1 g/dL / ALK PHOS: 59 U/L / ALT: 19 U/L / AST: 21 U/L / GGT: x                                                                                                MEDICATIONS  (STANDING):  atorvastatin 80 milliGRAM(s) Oral at bedtime  chlorhexidine 2% Cloths 1 Application(s) Topical <User Schedule>  enoxaparin Injectable 80 milliGRAM(s) SubCutaneous every 12 hours  meclizine 12.5 milliGRAM(s) Oral every 8 hours  pantoprazole    Tablet 40 milliGRAM(s) Oral before breakfast  sodium chloride 0.9%. 1000 milliLiter(s) (75 mL/Hr) IV Continuous <Continuous>    MEDICATIONS  (PRN):  acetaminophen     Tablet .. 650 milliGRAM(s) Oral every 6 hours PRN Moderate Pain (4 - 6)  ondansetron    Tablet 4 milliGRAM(s) Oral every 8 hours PRN Nausea      X-Rays reviewed

## 2022-09-28 NOTE — PROGRESS NOTE ADULT - ASSESSMENT
IMPRESSION:    Acute ANGIE segmental PE - unprovoked  Dizziness being worked up for CVA    RECOMMENDATIONS:    Needs long term anti coagulation given unprovoked and segmental PE (spoke to radiology)  f/u Echo with bubble study  Age appropriate screenings.  Hypercoagulable state work up   LE duplex - negative  MRI negative for ischemic stroke  Encourage incentive spirometry  HOB at 46, aspiration precautions  PT / OT  OP pulmonary follow up  OP HemOnc follow up  DEVEN Corado IMPRESSION:    Acute ANGIE segmental PE - unprovoked  Dizziness being worked up for CVA    RECOMMENDATIONS:    Needs long term anti coagulation given unprovoked and segmental PE (spoke to radiology )  f/u Echo with bubble study  Age appropriate malignancy sceenings.  Hypercoagulable state work up   LE duplex - negative  MRI negative for ischemic stroke  Encourage incentive spirometry  HOB at 45, aspiration precautions  PT / OT  OP pulmonary follow up  OP pulmonary follow up   OP Hem Onc follow up  DEVEN Corado

## 2022-09-28 NOTE — PROGRESS NOTE ADULT - SUBJECTIVE AND OBJECTIVE BOX
NEUROLOGY CONSULT PROGRESS NOTE    INTERVAL HISTORY:      REVIEW OF SYSTEMS:  As per HPI, otherwise negative for Constitutional, Eyes, Ears/Nose/Mouth/Throat, Neck, Cardiovascular, Respiratory, Gastrointestinal, Genitourinary, Skin, Endocrine, Musculoskeletal, Psychiatric, and Hematologic/Lymphatic.    MEDICATIONS:  acetaminophen     Tablet .. 650 milliGRAM(s) Oral every 6 hours PRN  atorvastatin 80 milliGRAM(s) Oral at bedtime  chlorhexidine 2% Cloths 1 Application(s) Topical <User Schedule>  enoxaparin Injectable 80 milliGRAM(s) SubCutaneous every 12 hours  meclizine 12.5 milliGRAM(s) Oral every 8 hours  ondansetron    Tablet 4 milliGRAM(s) Oral every 8 hours PRN  pantoprazole    Tablet 40 milliGRAM(s) Oral before breakfast  sodium chloride 0.9%. 1000 milliLiter(s) IV Continuous <Continuous>    VITAL SIGNS:  Vital Signs Last 24 Hrs  T(C): 36.5 (28 Sep 2022 04:05), Max: 36.8 (28 Sep 2022 00:05)  T(F): 97.7 (28 Sep 2022 04:05), Max: 98.3 (28 Sep 2022 00:05)  HR: 48 (28 Sep 2022 04:42) (46 - 70)  BP: 113/79 (28 Sep 2022 04:42) (108/72 - 146/89)  BP(mean): 90 (28 Sep 2022 04:42) (83 - 112)  RR: 18 (28 Sep 2022 04:05) (18 - 18)  SpO2: 100% (28 Sep 2022 04:05) (98% - 100%)    Parameters below as of 28 Sep 2022 00:05  Patient On (Oxygen Delivery Method): room air    PHYSICAL EXAMINATION:  Constitutional: WDWN; NAD  Eyes: anicteric sclera  Cardiovascular: +S1/S2, RRR; no M/R/G  Neurologic:  - Mental Status:  AAOx3; speech is fluent with intact naming, repetition, and comprehension  - Cranial Nerves II-XII:    II:  PERRLA; visual fields are full to confrontation  III, IV, VI:  EOMI, nystagmus beating faster with conjugated gaze upwards and left side  V:  facial sensation is intact in the V1-V3 distribution bilaterally.  VII:  face is symmetric with normal eye closure and smile  VIII:  hearing loss to finger rub on the right side. Degroot to the left side. No skew deviation. Head impulse test positive to the left.   IX, X:  uvula is midline and soft palate rises symmetrically  XI:  head turning and shoulder shrug are intact bilaterally  XII:  tongue protrudes in the midline  - Motor:  strength is 5/5 throughout; normal muscle bulk and tone throughout; no pronator drift  - Reflexes:  2+ and symmetric at the biceps, triceps, brachioradialis, knees, and ankles;  plantar reflexes downgoing bilaterally  - Sensory:  intact to light touch, pin prick, vibration, and joint-position sense throughout  - Coordination:  finger-nose-finger and heel-knee-shin intact without dysmetria; rapid alternating hand movements intact  - Gait: Non assessed     LABS:                          14.1   4.76  )-----------( 222      ( 27 Sep 2022 06:30 )             41.2     09-27    140  |  103  |  10  ----------------------------<  113<H>  3.9   |  28  |  1.0    Ca    8.4      27 Sep 2022 06:30  Mg     2.0     09-27    TPro  6.1  /  Alb  4.0  /  TBili  0.9  /  DBili  x   /  AST  21  /  ALT  19  /  AlkPhos  59  09-27    PTT - ( 26 Sep 2022 11:22 )  PTT:95.2 sec  Urinalysis Basic - ( 26 Sep 2022 11:20 )    Color: Light Yellow / Appearance: Clear / SG: >1.050 / pH: x  Gluc: x / Ketone: Negative  / Bili: Negative / Urobili: <2 mg/dL   Blood: x / Protein: Trace / Nitrite: Negative   Leuk Esterase: Negative / RBC: x / WBC x   Sq Epi: x / Non Sq Epi: x / Bacteria: x        RADIOLOGY & ADDITIONAL STUDIES:      IMPRESSION & RECOMMENDATIONS:

## 2022-09-29 VITALS — HEIGHT: 60.1 IN | WEIGHT: 176.15 LBS

## 2022-09-29 LAB
HCT VFR BLD CALC: 41.7 % — LOW (ref 42–52)
HGB BLD-MCNC: 14.4 G/DL — SIGNIFICANT CHANGE UP (ref 14–18)
MCHC RBC-ENTMCNC: 30.1 PG — SIGNIFICANT CHANGE UP (ref 27–31)
MCHC RBC-ENTMCNC: 34.5 G/DL — SIGNIFICANT CHANGE UP (ref 32–37)
MCV RBC AUTO: 87.1 FL — SIGNIFICANT CHANGE UP (ref 80–94)
NRBC # BLD: 0 /100 WBCS — SIGNIFICANT CHANGE UP (ref 0–0)
PLATELET # BLD AUTO: 213 K/UL — SIGNIFICANT CHANGE UP (ref 130–400)
RBC # BLD: 4.79 M/UL — SIGNIFICANT CHANGE UP (ref 4.7–6.1)
RBC # FLD: 13.1 % — SIGNIFICANT CHANGE UP (ref 11.5–14.5)
WBC # BLD: 4.93 K/UL — SIGNIFICANT CHANGE UP (ref 4.8–10.8)
WBC # FLD AUTO: 4.93 K/UL — SIGNIFICANT CHANGE UP (ref 4.8–10.8)

## 2022-09-29 PROCEDURE — 99232 SBSQ HOSP IP/OBS MODERATE 35: CPT

## 2022-09-29 PROCEDURE — 99238 HOSP IP/OBS DSCHRG MGMT 30/<: CPT

## 2022-09-29 RX ORDER — APIXABAN 2.5 MG/1
2 TABLET, FILM COATED ORAL
Qty: 120 | Refills: 2
Start: 2022-09-29 | End: 2022-12-27

## 2022-09-29 RX ORDER — APIXABAN 2.5 MG/1
2 TABLET, FILM COATED ORAL
Qty: 120 | Refills: 0
Start: 2022-09-29 | End: 2022-10-28

## 2022-09-29 RX ORDER — MECLIZINE HCL 12.5 MG
1 TABLET ORAL
Qty: 90 | Refills: 0
Start: 2022-09-29 | End: 2022-10-28

## 2022-09-29 RX ADMIN — Medication 12.5 MILLIGRAM(S): at 05:14

## 2022-09-29 RX ADMIN — Medication 12.5 MILLIGRAM(S): at 13:07

## 2022-09-29 RX ADMIN — PANTOPRAZOLE SODIUM 40 MILLIGRAM(S): 20 TABLET, DELAYED RELEASE ORAL at 05:14

## 2022-09-29 RX ADMIN — APIXABAN 10 MILLIGRAM(S): 2.5 TABLET, FILM COATED ORAL at 05:14

## 2022-09-29 RX ADMIN — CHLORHEXIDINE GLUCONATE 1 APPLICATION(S): 213 SOLUTION TOPICAL at 05:14
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
## 2022-09-29 NOTE — PROGRESS NOTE ADULT - SUBJECTIVE AND OBJECTIVE BOX
NEUROLOGY CONSULT PROGRESS NOTE    INTERVAL HISTORY:      REVIEW OF SYSTEMS:  As per HPI, otherwise negative for Constitutional, Eyes, Ears/Nose/Mouth/Throat, Neck, Cardiovascular, Respiratory, Gastrointestinal, Genitourinary, Skin, Endocrine, Musculoskeletal, Psychiatric, and Hematologic/Lymphatic.    MEDICATIONS:  acetaminophen     Tablet .. 650 milliGRAM(s) Oral every 6 hours PRN  apixaban 10 milliGRAM(s) Oral every 12 hours  atorvastatin 80 milliGRAM(s) Oral at bedtime  chlorhexidine 2% Cloths 1 Application(s) Topical <User Schedule>  meclizine 12.5 milliGRAM(s) Oral every 8 hours  ondansetron    Tablet 4 milliGRAM(s) Oral every 8 hours PRN  pantoprazole    Tablet 40 milliGRAM(s) Oral before breakfast  sodium chloride 0.9%. 1000 milliLiter(s) IV Continuous <Continuous>    VITAL SIGNS:  Vital Signs Last 24 Hrs  T(C): 36.3 (29 Sep 2022 04:23), Max: 36.3 (29 Sep 2022 04:23)  T(F): 97.4 (29 Sep 2022 04:23), Max: 97.4 (29 Sep 2022 04:23)  HR: 52 (29 Sep 2022 04:23) (50 - 77)  BP: 112/73 (29 Sep 2022 04:23) (112/73 - 129/75)  BP(mean): 87 (29 Sep 2022 04:23) (87 - 97)  RR: 16 (29 Sep 2022 04:23) (16 - 18)  SpO2: 98% (29 Sep 2022 04:23) (98% - 100%)    Parameters below as of 29 Sep 2022 04:23  Patient On (Oxygen Delivery Method): room air        PHYSICAL EXAMINATION:  Constitutional: WDWN; NAD  Eyes: anicteric sclera  Cardiovascular: +S1/S2, RRR; no M/R/G  Neurologic:  - Mental Status:  AAOx3; speech is fluent with intact naming, repetition, and comprehension  - Cranial Nerves II-XII:    II:  PERRLA; visual fields are full to confrontation  III, IV, VI:  EOMI, no nystagmus  V:  facial sensation is intact in the V1-V3 distribution bilaterally.  VII:  face is symmetric with normal eye closure and smile  VIII:  hearing is intact to finger rub  IX, X:  uvula is midline and soft palate rises symmetrically  XI:  head turning and shoulder shrug are intact bilaterally  XII:  tongue protrudes in the midline  - Motor:  strength is 5/5 throughout; normal muscle bulk and tone throughout; no pronator drift  - Reflexes:  2+ and symmetric at the biceps, triceps, brachioradialis, knees, and ankles;  plantar reflexes downgoing bilaterally  - Sensory:  intact to light touch, pin prick, vibration, and joint-position sense throughout  - Coordination:  finger-nose-finger and heel-knee-shin intact without dysmetria; rapid alternating hand movements intact  - Gait:  normal steps, base, arm swing, and turning; heel and toe walking are normal; tandem gait is normal; Romberg testing is negative    LABS:                          14.3   4.51  )-----------( 194      ( 28 Sep 2022 06:36 )             41.9                 RADIOLOGY & ADDITIONAL STUDIES:      IMPRESSION & RECOMMENDATIONS:

## 2022-09-29 NOTE — DISCHARGE NOTE PROVIDER - NSDCMRMEDTOKEN_GEN_ALL_CORE_FT
Eliquis Starter Pack for Treatment of DVT and PE 5 mg oral tablet: 2 tab(s) orally 2 times a day x 5 days  then 1 tab orally 2 times a day   Eliquis 5 mg oral tablet: 2 tab(s) orally every 12 hours  meclizine 12.5 mg oral tablet: 1 tab(s) orally every 8 hours

## 2022-09-29 NOTE — OCCUPATIONAL THERAPY INITIAL EVALUATION ADULT - GENERAL OBSERVATIONS, REHAB EVAL
Pt received semi sanders in bed in NAD, agreeable to OT evaluation, +tele, +BP cuff, +pulse oxi, left seated in b/s chair in NAD, all lines intact, RN aware

## 2022-09-29 NOTE — DISCHARGE NOTE PROVIDER - CARE PROVIDER_API CALL
Kyler Delatorre)  Neurology  [address] [place] [address]  Phone: [phone]  Fax: [phone]  Follow Up Time: 2 weeks

## 2022-09-29 NOTE — CHART NOTE - NSCHARTNOTEFT_GEN_A_CORE
Patient referred to outpatient PT and OT at [facility] for Vestibular Protocol/ balance and gait and strengthening rehab.

## 2022-09-29 NOTE — PROGRESS NOTE ADULT - ATTENDING COMMENTS
Pt is a 50 yo M with no significant PMHx who presents with vertigo x 3 days, found to have PE.      MRI brain negative for acute ischemia. Vertigo likely peripheral.   No know h/o stroke, DVT/PE or clotting disorder.   BLE venous doppler negative. TTE normla EF and -PFO.  Hypercoag panel pending. Continue high intensity statin for HLD. Pre-DM.   Appreciate pulm recs, confirmed segmental PE. Switch to eliquis 10mg BID x 7 days then 5mg BID.    D/c home today. Outpatient PT/OT and ENT f/u.   F/u with PCP for pre-DM and HLD, and with Pulmonary for PE.
Pt is a 52 yo M with no significant PMHx who presents with vertigo x 3 days, found to have PE.      MRI brain negative for acute ischemia. Vertigo likely peripheral.   No know h/o stroke, DVT/PE or clotting disorder.   BLE venous doppler negative. TTE pending.   Hypercoag panel pending. Continue high intensity statin for HLD. Pre-DM.   Appreciate pulm recs, confirmed segmental PE. Switch to eliquis 10mg BID x 7 days then 5mg BID.     PT/OT/ST, tele, -160, q8 neurochecks. Likely d/c tomorrow. Outpatient vestibular therapy and ENT f/u.
IMPRESSION:    Acute ANGIE segmental PE - unprovoked  Dizziness being worked up for CVA    Plan as outlined above
Pt is a 50 yo M with no significant PMHx who presents with vertigo x 3 days, found to have PE. Nystagmus improved today, also notes hearing loss in right ear.     MRI brain in process.   No know h/o stroke, DVT/PE or clotting disorder.   Recommend TTE with bubble study, BLE venous doppler.   Hypercoag panel, lipid profile, A1c.   AC. PT/OT/ST, tele, -160, q4 neurochecks.
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
## 2022-09-29 NOTE — PROGRESS NOTE ADULT - TIME BILLING
time spent on review of labs, imaging studies, old records, obtaining history, personally examining patient, counselling and communicating with patient/ family, entering orders for medications/tests/etc, discussions with other health care providers, documentation in electronic health records, independent interpretation of labs, imaging/procedure results and care coordination.
Review of imaging and chart; obtaining history; examination of pt; discussion and coordination of care.
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
## 2022-09-29 NOTE — DISCHARGE NOTE PROVIDER - NSDCCPCAREPLAN_GEN_ALL_CORE_FT
PRINCIPAL DISCHARGE DIAGNOSIS  Diagnosis: Dizziness  Assessment and Plan of Treatment: Likely secondary to vestibular neuritis. Dizziness is a term used to describe a range of sensations, such as feeling faint, woozy, weak or unsteady. Dizziness that creates the false sense that you or your surroundings are spinning or moving is called vertigo.  Dizziness is one of the more common reasons adults visit their doctors. Frequent dizzy spells or constant dizziness can significantly affect your life. But dizziness rarely signals a life-threatening condition.  Treatment of dizziness depends on the cause and your symptoms. It's usually effective, but the problem may recur.   PLAN:  - If vertigo, meclizine 12.5 mg every 8 hours  - Should follow up outpatient with ENT, vestibular rehabilitation and neuro-ophthalmology      SECONDARY DISCHARGE DIAGNOSES  Diagnosis: Pulmonary embolism  Assessment and Plan of Treatment: Pulmonary embolism is a blockage in one of the pulmonary arteries in your lungs. In most cases, pulmonary embolism is caused by blood clots that travel to the lungs from deep veins in the legs or, rarely, from veins in other parts of the body (deep vein thrombosis).  Because the clots block blood flow to the lungs, pulmonary embolism can be life-threatening. However, prompt treatment greatly reduces the risk of death. Taking measures to prevent blood clots in your legs will help protect you against pulmonary embolism.  PLAN:  - Follow up your hypercoagulable genetic testing results.  - Continue with Eliquis 10 mg every 12 hours till 9/30/22.  - After it, Eliquis 5 mg every 12 hours for 6 months.   - Should follow up outpatient with pulmonology
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
## 2022-09-30 LAB — PROT S FREE PPP-ACNC: 102 % — SIGNIFICANT CHANGE UP (ref 63–140)

## 2022-10-03 LAB
DNA PLOIDY SPEC FC-IMP: SIGNIFICANT CHANGE UP
PTR INTERPRETATION: SIGNIFICANT CHANGE UP

## 2022-10-04 DIAGNOSIS — E78.5 HYPERLIPIDEMIA, UNSPECIFIED: ICD-10-CM

## 2022-10-04 DIAGNOSIS — G58.8 OTHER SPECIFIED MONONEUROPATHIES: ICD-10-CM

## 2022-10-04 DIAGNOSIS — E87.2 ACIDOSIS: ICD-10-CM

## 2022-10-04 DIAGNOSIS — H55.09 OTHER FORMS OF NYSTAGMUS: ICD-10-CM

## 2022-10-04 DIAGNOSIS — E86.0 DEHYDRATION: ICD-10-CM

## 2022-10-04 DIAGNOSIS — Z87.891 PERSONAL HISTORY OF NICOTINE DEPENDENCE: ICD-10-CM

## 2022-10-04 DIAGNOSIS — R29.810 FACIAL WEAKNESS: ICD-10-CM

## 2022-10-04 DIAGNOSIS — I26.99 OTHER PULMONARY EMBOLISM WITHOUT ACUTE COR PULMONALE: ICD-10-CM

## 2022-10-04 DIAGNOSIS — H81.10 BENIGN PAROXYSMAL VERTIGO, UNSPECIFIED EAR: ICD-10-CM

## 2022-10-04 DIAGNOSIS — H91.91 UNSPECIFIED HEARING LOSS, RIGHT EAR: ICD-10-CM

## 2022-10-04 DIAGNOSIS — R73.03 PREDIABETES: ICD-10-CM

## 2022-11-08 NOTE — CDI QUERY NOTE - NSCDIOTHERTXTBX_GEN_ALL_CORE_HH
CLINICAL INDICATORS:    HP 9/26- 4 day history of severe dizziness and nausea.  Dizziness is described as spinning sensation especially when eyes are open and there is no alleviating factors. States dizziness is worse with any kind of movement or position change.    Neuro 9/29-  #Dizziness with acute episode of vertigo (r/o acute ischemic stroke in vertebrobasilar territory)  4-day history with nystagmus, mild right facial droop.  Attestation: MRI brain negative for acute ischemia. Vertigo likely peripheral.    Rehab Attending 9/29- Patient referred to outpatient PT and OT at [facility] for Vestibular Protocol/ balance and gait and strengthening rehab.    DCS 9/29-Course: #Dizziness with acute episode of vertigo (likely vestibular neuritis)  4-day history with vertigo. During his admission to the stroke unit, patient presented with horizontal and vertical nystagmus, variable head impulse test and loss of hearing in the right ear. Rinne positive and Degroot to the left side. PRINCIPAL DISCHARGE DIAGNOSIS: Vestibular neuritis. Assessment and Plan of Treatment: Vestibular neuritis is a disorder that affects the nerve of the inner ear called the vestibulocochlear nerve. This nerve sends balance and head position information from the inner ear to the brain. When this nerve becomes swollen (inflamed), it disrupts the way the information would normally be interpreted by the brain. The vestibulocochlear nerve sends balance and head position information from the inner ear to the brain. When the nerve becomes swollen, the brain can’t interpret the information correctly. This results in a person experiencing such symptoms as dizziness and vertigo. Vestibular neuritis and labyrinthitis are closely related disorders. Vestibular neuritis involves swelling of a branch of the vestibulocochlear nerve (the vestibular portion) that affects balance. Labyrinthitis involves the swelling of both branches of the vestibulocochlear nerve (the vestibular portion and  the cochlear portion) that affects balance and hearing. The symptoms of labyrinthitis are the same as vestibular neuritis plus the additional symptoms of tinnitus (ringing in the ears) and/or hearing loss. The most likely cause of Labyrinthitis or Vestibular Neuritis is a viral infection of the inner ear, swelling around the vestibulocochlear nerve (caused by a virus), or a viral infection that has occurred somewhere else in the body    Based on your professional judgment and the clinical indicators, please clarify if vestibular neuritis can be further specified as:    Vestibular Neuritis of the acoustic/auditory nerve of the inner ear  Vestibular Neuronitis  Other (please specify):  Clinically unable to determine    Thank you,  Yusra Argueta RN, BSN, BBA, CCDS, [facility]  [phone]